# Patient Record
Sex: MALE | Race: WHITE | NOT HISPANIC OR LATINO | URBAN - METROPOLITAN AREA
[De-identification: names, ages, dates, MRNs, and addresses within clinical notes are randomized per-mention and may not be internally consistent; named-entity substitution may affect disease eponyms.]

---

## 2017-11-30 ENCOUNTER — OFFICE (OUTPATIENT)
Dept: URBAN - METROPOLITAN AREA CLINIC 42 | Facility: CLINIC | Age: 82
End: 2017-11-30

## 2017-11-30 VITALS
WEIGHT: 136 LBS | SYSTOLIC BLOOD PRESSURE: 126 MMHG | DIASTOLIC BLOOD PRESSURE: 66 MMHG | HEART RATE: 72 BPM | HEIGHT: 66 IN

## 2017-11-30 DIAGNOSIS — K59.00 CONSTIPATION, UNSPECIFIED: ICD-10-CM

## 2017-11-30 PROCEDURE — 99212 OFFICE O/P EST SF 10 MIN: CPT

## 2019-12-12 ENCOUNTER — OFFICE (OUTPATIENT)
Dept: URBAN - METROPOLITAN AREA CLINIC 42 | Facility: CLINIC | Age: 84
End: 2019-12-12

## 2019-12-12 VITALS
WEIGHT: 126 LBS | DIASTOLIC BLOOD PRESSURE: 78 MMHG | HEART RATE: 73 BPM | SYSTOLIC BLOOD PRESSURE: 144 MMHG | HEIGHT: 66 IN

## 2019-12-12 DIAGNOSIS — R74.8 ABNORMAL LEVELS OF OTHER SERUM ENZYMES: ICD-10-CM

## 2019-12-12 DIAGNOSIS — R13.10 DYSPHAGIA, UNSPECIFIED: ICD-10-CM

## 2019-12-12 DIAGNOSIS — R93.3 ABNORMAL FINDINGS ON DIAGNOSTIC IMAGING OF OTHER PARTS OF DI: ICD-10-CM

## 2019-12-12 DIAGNOSIS — Z86.010 PERSONAL HISTORY OF COLONIC POLYPS: ICD-10-CM

## 2019-12-12 DIAGNOSIS — R63.4 ABNORMAL WEIGHT LOSS: ICD-10-CM

## 2019-12-12 DIAGNOSIS — K59.00 CONSTIPATION, UNSPECIFIED: ICD-10-CM

## 2019-12-12 PROCEDURE — 99214 OFFICE O/P EST MOD 30 MIN: CPT

## 2020-01-13 ENCOUNTER — ON CAMPUS - OUTPATIENT (OUTPATIENT)
Dept: URBAN - METROPOLITAN AREA HOSPITAL 91 | Facility: HOSPITAL | Age: 85
End: 2020-01-13

## 2020-01-13 DIAGNOSIS — K31.7 POLYP OF STOMACH AND DUODENUM: ICD-10-CM

## 2020-01-13 DIAGNOSIS — K44.9 DIAPHRAGMATIC HERNIA WITHOUT OBSTRUCTION OR GANGRENE: ICD-10-CM

## 2020-01-13 DIAGNOSIS — R13.10 DYSPHAGIA, UNSPECIFIED: ICD-10-CM

## 2020-01-13 DIAGNOSIS — R93.3 ABNORMAL FINDINGS ON DIAGNOSTIC IMAGING OF OTHER PARTS OF DI: ICD-10-CM

## 2020-01-13 DIAGNOSIS — R63.4 ABNORMAL WEIGHT LOSS: ICD-10-CM

## 2020-01-13 DIAGNOSIS — K21.0 GASTRO-ESOPHAGEAL REFLUX DISEASE WITH ESOPHAGITIS: ICD-10-CM

## 2020-01-13 PROCEDURE — 43239 EGD BIOPSY SINGLE/MULTIPLE: CPT | Mod: 59

## 2020-01-13 PROCEDURE — 45378 DIAGNOSTIC COLONOSCOPY: CPT

## 2020-01-13 PROCEDURE — 43251 EGD REMOVE LESION SNARE: CPT

## 2023-01-13 ENCOUNTER — OFFICE VISIT (OUTPATIENT)
Dept: SURGERY | Facility: CLINIC | Age: 88
End: 2023-01-13
Payer: MEDICARE

## 2023-01-13 VITALS — WEIGHT: 137 LBS | BODY MASS INDEX: 22.82 KG/M2 | HEIGHT: 65 IN

## 2023-01-13 DIAGNOSIS — K59.00 CONSTIPATION, UNSPECIFIED CONSTIPATION TYPE: Primary | ICD-10-CM

## 2023-01-13 PROCEDURE — 99202 OFFICE O/P NEW SF 15 MIN: CPT | Performed by: PHYSICIAN ASSISTANT

## 2023-01-13 RX ORDER — TAMSULOSIN HYDROCHLORIDE 0.4 MG/1
1 CAPSULE ORAL DAILY
COMMUNITY

## 2023-01-13 RX ORDER — DIPHENOXYLATE HYDROCHLORIDE AND ATROPINE SULFATE 2.5; .025 MG/1; MG/1
1 TABLET ORAL DAILY
COMMUNITY

## 2023-01-13 RX ORDER — POLYETHYLENE GLYCOL 3350 17 G/17G
17 POWDER, FOR SOLUTION ORAL DAILY
COMMUNITY

## 2023-01-13 NOTE — PROGRESS NOTES
ASSESSMENT/PLAN:    This is a 89-year-old gentleman with 3 days of constipation.  His abdominal exam is benign.  He is using MiraLAX daily and I am going to encourage him to increase this to twice daily initially.  If he has no improvement and I did encourage him to use milk of magnesia until he starts having bowel movements.  I did inform him that given his benign exam and minimal symptoms that no imaging study is currently necessary and no surgical intervention is warranted.  All questions were answered and he was willing to proceed with all recommendations.    CC:     Constipation    HPI:    This is a 89-year-old gentleman presenting to the office today as a self-referral.  Over the last 3 days he states he has had a very difficult time having a bowel movement and has felt as if he is becoming more distended.  He states prior to this that he has been very regular, having MiraLAX in his coffee daily making him go every day.  Over the last 3 days he states no matter how much he strains he only has a smaller amount of formed stool come out.  He states he does feel as if he has some mild abdominal discomfort but more feels distended and just wants to have a bowel movement.  He denies abdominal pain, rectal pain, nausea, vomiting, fever, chills, dark tarry stools or bright red blood with his bowel movements.    ENDOSCOPY:   • Patient states colonoscopy 2021    SOCIAL HISTORY:   • Denies tobacco use  • Denies alcohol use    FAMILY HISTORY:    • Colorectal cancer: None    PREVIOUS ABDOMINAL SURGERY    • Cholecystectomy  • Bilateral inguinal hernia repair  • Laparoscopic bilateral inguinal hernia repair recurrent    OTHER SURGERY  Past Surgical History:   Procedure Laterality Date   • BASAL CELL CARCINOMA EXCISION      multiple   • CATARACT EXTRACTION Bilateral    • CHOLECYSTECTOMY N/A 2002   • COLONOSCOPY N/A     2021 approx. normal per patient   • COLONOSCOPY N/A 06/20/2006    Dr. Trujillo   • COLONOSCOPY N/A 11/04/2003  "  • CYSTOSCOPY BLADDER STONE LITHOTRIPSY     • CYSTOSCOPY TRANSURETHRAL RESECTION OF PROSTATE     • ENDOSCOPY N/A 03/01/2004    Dr. Oreilly   • ENDOSCOPY N/A 07/02/2004    Dr. Oreilly   • INGUINAL HERNIA REPAIR Bilateral 12/01/2003    Laparoscopic bilateral recurrent inguinal hernia repair-Dr. Oreilly   • INGUINAL HERNIA REPAIR Bilateral    • RETINAL DETACHMENT REPAIR Left    • TONSILLECTOMY AND ADENOIDECTOMY         PAST MEDICAL HISTORY:    Past Medical History:   Diagnosis Date   • Basal cell carcinoma    • GERD (gastroesophageal reflux disease)    • Prostate disease        MEDICATIONS:     Current Outpatient Medications:   •  Cholecalciferol (VITAMIN D3 PO), Take 1 tablet by mouth Daily., Disp: , Rfl:   •  Multiple Vitamins-Minerals (PRESERVISION AREDS 2 PO), Take  by mouth., Disp: , Rfl:   •  multivitamin (MULTIVITAMIN PO), Take 1 tablet by mouth Daily., Disp: , Rfl:   •  omeprazole (PriLOSEC) 20 MG capsule, Take 20 mg by mouth daily., Disp: , Rfl:   •  polyethylene glycol (MiraLax) 17 g packet, Take 17 g by mouth Daily., Disp: , Rfl:   •  tamsulosin (FLOMAX) 0.4 MG capsule 24 hr capsule, Take 1 capsule by mouth Daily., Disp: , Rfl:     ALLERGIES:   Allergies   Allergen Reactions   • Ciprofloxacin Nausea Only     Patient unsure of allergy       PHYSICAL EXAM:   • Constitutional: Well-developed well-nourished, no acute distress  • Vital signs:   o Height 65\"  o Weight 137  o BMI 22.8  • Neck: Supple, no palpable mass, trachea midline  • Gastrointestinal: Mildly distended, soft, nontender  • Psychiatric: Alert and oriented ×3, normal affect       Kishore Farias PA-C    "